# Patient Record
(demographics unavailable — no encounter records)

---

## 2025-05-28 NOTE — REASON FOR VISIT
[Consultation] : a consultation visit [DM Type 1] : DM Type 1 [Spouse] : spouse [FreeTextEntry2] : Dr. Mendez

## 2025-05-28 NOTE — CONSULT LETTER
[Dear  ___] : Dear  [unfilled], [Consult Letter:] : I had the pleasure of evaluating your patient, [unfilled]. [Please see my note below.] : Please see my note below. [Consult Closing:] : Thank you very much for allowing me to participate in the care of this patient.  If you have any questions, please do not hesitate to contact me. [Sincerely,] : Sincerely, [FreeTextEntry3] : Tati Deluna, DO
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
## 2025-05-28 NOTE — PHYSICAL EXAM
[Alert] : alert [Obese] : obese [EOMI] : extra ocular movement intact [Normal Rate and Effort] : normal respiratory rate and effort [Clear to Auscultation] : lungs were clear to auscultation bilaterally [Normal S1, S2] : normal S1 and S2 [Normal Rate] : heart rate was normal [No Edema] : no peripheral edema [Not Tender] : non-tender [Soft] : abdomen soft [Normal Gait] : normal gait [Oriented x3] : oriented to person, place, and time [Normal Affect] : the affect was normal [Normal Insight/Judgement] : insight and judgment were intact [Normal Mood] : the mood was normal [Acanthosis Nigricans] : no acanthosis nigricans
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
## 2025-07-11 NOTE — ASSESSMENT
[Diabetes Foot Care] : diabetes foot care [Long Term Vascular Complications] : long term vascular complications of diabetes [Carbohydrate Consistent Diet] : carbohydrate consistent diet [Importance of Diet and Exercise] : importance of diet and exercise to improve glycemic control, achieve weight loss and improve cardiovascular health [Exercise/Effect on Glucose] : exercise/effect on glucose [Retinopathy Screening] : Patient was referred to ophthalmology for retinopathy screening [FreeTextEntry1] : T2DM -Reviewed risk/complication of uncontrolled DM  -Increase exercise as tolerated 5 days a week x 30 mins/day -Increase dietary efforts, low carb/low sugar -Continue to check FS twice a day and keep log, bring log to next appt -Repeat HgbA1C prior to next appt  -Continue  mg BID -Go for diabetic eye exam   HLD -Continue statin  HTN -BP stable, continue Ramipril 10 mg BID and amlodipine 2.5 mg QD   RTO in 4 weeks CDE RTO in 3 months MD